# Patient Record
Sex: MALE | Race: WHITE
[De-identification: names, ages, dates, MRNs, and addresses within clinical notes are randomized per-mention and may not be internally consistent; named-entity substitution may affect disease eponyms.]

---

## 2018-08-09 ENCOUNTER — HOSPITAL ENCOUNTER (OUTPATIENT)
Dept: HOSPITAL 44 - RAD | Age: 53
End: 2018-08-09
Attending: INTERNAL MEDICINE
Payer: COMMERCIAL

## 2018-08-09 DIAGNOSIS — Z79.899: Primary | ICD-10-CM

## 2018-08-09 PROCEDURE — 71046 X-RAY EXAM CHEST 2 VIEWS: CPT

## 2018-08-09 NOTE — DIAGNOSTIC IMAGING REPORT
ARMEN ESTEVES 

[facility]

[id number]

[place], [place]. [address]

 

 

 

 

Report Submission Date: Aug 9, 2018 2:43:56 PM CDT

Patient       Study

Name:   ONELIA CHEN       Date:   Aug 9, 2018 12:49:41 PM CDT

MRN:   [id number]       Modality Type:   DX

Gender:   M       Description:   CHEST

:   65       Institution:   [facility]

Physician:   ARMEN ESTEVES

     Accession:    [id number]

 

 

Examination: PA and lateral chest. 



History: Evaluate lung fields. CXR, LONG TERM CURRENT USE OF OTHER MEDICATIONS. 
PT STATES NO CHEST COMPLAINTS AND THAT HE TAKES HUMIRA (Hx) 



Comparison exam: None provided. 



Findings: PA lateral chest demonstrate a normal cardiac and mediastinal 
silhouette. No focal infiltrate.  No blunting of the costophrenic margins. Mild 
acromioclavicular joint degenerative changes. 



Impression: No acute pulmonary process.

 

Electronically signed on Aug 9, 2018 2:43:56 PM CDT by:

Raul BARDALES